# Patient Record
Sex: MALE | Race: BLACK OR AFRICAN AMERICAN | Employment: UNEMPLOYED | URBAN - METROPOLITAN AREA
[De-identification: names, ages, dates, MRNs, and addresses within clinical notes are randomized per-mention and may not be internally consistent; named-entity substitution may affect disease eponyms.]

---
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
## 2022-02-07 ENCOUNTER — HOSPITAL ENCOUNTER (EMERGENCY)
Age: 22
Discharge: LEFT BEFORE DISCHARGE | End: 2022-02-07
Attending: EMERGENCY MEDICINE
Payer: COMMERCIAL

## 2022-02-07 VITALS
BODY MASS INDEX: 40.43 KG/M2 | SYSTOLIC BLOOD PRESSURE: 142 MMHG | HEART RATE: 68 BPM | DIASTOLIC BLOOD PRESSURE: 76 MMHG | TEMPERATURE: 98.5 F | OXYGEN SATURATION: 99 % | WEIGHT: 315 LBS | HEIGHT: 74 IN | RESPIRATION RATE: 16 BRPM

## 2022-02-07 PROCEDURE — 75810000275 HC EMERGENCY DEPT VISIT NO LEVEL OF CARE

## 2023-05-16 RX ORDER — OMEPRAZOLE 20 MG/1
CAPSULE, DELAYED RELEASE ORAL DAILY
COMMUNITY
Start: 2019-12-02

## 2023-05-16 RX ORDER — SUCRALFATE 1 G/1
TABLET ORAL 4 TIMES DAILY
COMMUNITY
Start: 2019-12-02

## 2023-05-16 RX ORDER — ALBUTEROL SULFATE 90 UG/1
2 AEROSOL, METERED RESPIRATORY (INHALATION)
COMMUNITY

## 2025-01-04 ENCOUNTER — HOSPITAL ENCOUNTER (EMERGENCY)
Facility: HOSPITAL | Age: 25
Discharge: HOME | End: 2025-01-04

## 2025-01-04 VITALS
BODY MASS INDEX: 40.43 KG/M2 | TEMPERATURE: 98.8 F | HEIGHT: 74 IN | WEIGHT: 315 LBS | SYSTOLIC BLOOD PRESSURE: 163 MMHG | HEART RATE: 92 BPM | DIASTOLIC BLOOD PRESSURE: 80 MMHG | OXYGEN SATURATION: 99 % | RESPIRATION RATE: 20 BRPM

## 2025-01-04 DIAGNOSIS — U07.1 COVID-19: Primary | ICD-10-CM

## 2025-01-04 LAB
FLUAV RNA SPEC QL NAA+PROBE: NOT DETECTED
FLUBV RNA SPEC QL NAA+PROBE: NOT DETECTED
SARS-COV-2 RNA RESP QL NAA+PROBE: DETECTED
SOURCE: ABNORMAL

## 2025-01-04 PROCEDURE — 87636 SARSCOV2 & INF A&B AMP PRB: CPT

## 2025-01-04 PROCEDURE — 99283 EMERGENCY DEPT VISIT LOW MDM: CPT

## 2025-01-04 RX ORDER — IBUPROFEN 800 MG/1
800 TABLET, FILM COATED ORAL 3 TIMES DAILY PRN
Qty: 20 TABLET | Refills: 0 | Status: SHIPPED | OUTPATIENT
Start: 2025-01-04

## 2025-01-04 RX ORDER — ALBUTEROL SULFATE 90 UG/1
2 INHALANT RESPIRATORY (INHALATION) EVERY 4 HOURS PRN
Qty: 18 G | Refills: 0 | Status: SHIPPED | OUTPATIENT
Start: 2025-01-04

## 2025-01-04 RX ORDER — BENZONATATE 200 MG/1
200 CAPSULE ORAL 3 TIMES DAILY PRN
Qty: 15 CAPSULE | Refills: 0 | Status: SHIPPED | OUTPATIENT
Start: 2025-01-04 | End: 2025-01-09

## 2025-01-04 ASSESSMENT — LIFESTYLE VARIABLES
HOW OFTEN DO YOU HAVE A DRINK CONTAINING ALCOHOL: NEVER
HOW MANY STANDARD DRINKS CONTAINING ALCOHOL DO YOU HAVE ON A TYPICAL DAY: PATIENT DOES NOT DRINK

## 2025-01-05 NOTE — ED TRIAGE NOTES
ED visit d/t viral like sxs at home (coughing / fevers / fatigue / chills / malaise / body aches) - onset of sxs, 3 days ago - Endorses, hx of asthma - productive coughing at home, thick yellowish sputum - sick contacts at home, recently dxed with covid 19 - pt reports taking tylenol 1g nearly every 6 hours for reoccurring fevers - mother reports pt has a hx of HTN yet is not compliant with medications;;

## 2025-01-05 NOTE — ED PROVIDER NOTES
discussed)  None    Chronic Conditions:  has a past medical history of Asthma, Gastrointestinal disorder, and Morbid obesity.      Social Determinants affecting Dx or Tx: None    Records Reviewed (source and summary of external records): Nursing Notes and Old Medical Records    MDM: CC/HPI Summary, DDx, ED Course, and Reassessment, Disposition Considerations (Tests not done, Shared Decision Making, Pt Expectation of Test or Tx.):    Patient is a 24-year-old male who presents to the ED with cough, body aches, subjective fevers, chills and intermittent wheezing.  He does report a history of asthma but does not have an inhaler.  He states his mom who was present with them had COVID about 2 weeks ago but he denies any other known sick contacts.  On exam he is hypertensive but other vital signs stable, lung sounds are clear bilaterally, O2 sats 99% on room air, TMs and posterior pharynx unremarkable.  DDx: URI, COVID, flu, asthma.  Will send off COVID and flu swab due to recent exposure.  Mom is requesting an inhaler for patient to have as needed and possibly Tessalon Perles for the cough.    ED Course as of 01/04/25 2221   Sat Jan 04, 2025 2217 SARS-CoV-2, PCR(!): Detected  Pt and mother informed.  Will give inhaler to use prn, tessalon perles for cough and motrin for fever or pain [AH]      ED Course User Index  [AH] Marjorie Peunte PA-C       FINAL IMPRESSION     1. COVID-19          DISPOSITION/PLAN   DISPOSITION Decision To Discharge 01/04/2025 10:15:35 PM   DISPOSITION CONDITION Stable       Care plan outlined and precautions discussed.  Patient has no new complaints, changes, or physical findings.  Results of COVID were reviewed with the patient. All medications were reviewed with the patient; will d/c home. All of pt's questions and concerns were addressed. Patient was instructed and agrees to follow up with PCP, as well as to return to the ED upon further deterioration. Patient is ready to go

## 2025-05-24 ENCOUNTER — HOSPITAL ENCOUNTER (EMERGENCY)
Facility: HOSPITAL | Age: 25
Discharge: HOME | End: 2025-05-24

## 2025-05-24 VITALS
TEMPERATURE: 98 F | SYSTOLIC BLOOD PRESSURE: 132 MMHG | BODY MASS INDEX: 40.76 KG/M2 | OXYGEN SATURATION: 99 % | WEIGHT: 315 LBS | HEART RATE: 82 BPM | DIASTOLIC BLOOD PRESSURE: 89 MMHG | RESPIRATION RATE: 18 BRPM

## 2025-05-24 DIAGNOSIS — H00.024 HORDEOLUM INTERNUM OF LEFT UPPER EYELID: Primary | ICD-10-CM

## 2025-05-24 PROCEDURE — 99281 EMR DPT VST MAYX REQ PHY/QHP: CPT

## 2025-05-24 ASSESSMENT — ENCOUNTER SYMPTOMS
EYE REDNESS: 0
VOMITING: 0
COUGH: 0
EYE ITCHING: 1
NAUSEA: 0
ABDOMINAL PAIN: 0
EYE DISCHARGE: 1
EYE PAIN: 1
SHORTNESS OF BREATH: 0
BACK PAIN: 0
WHEEZING: 0
DIARRHEA: 0
CHEST TIGHTNESS: 0
SORE THROAT: 0
RHINORRHEA: 0
PHOTOPHOBIA: 0

## 2025-05-24 ASSESSMENT — PAIN - FUNCTIONAL ASSESSMENT: PAIN_FUNCTIONAL_ASSESSMENT: NONE - DENIES PAIN

## 2025-05-24 ASSESSMENT — VISUAL ACUITY: OU: 1

## 2025-05-24 NOTE — ED PROVIDER NOTES
[facility] EMERGENCY DEPARTMENT  EMERGENCY DEPARTMENT ENCOUNTER         Pt Name: Felix Solis  MRN: [id number]  Birthdate 2000  Date of evaluation: 5/24/2025  Provider: Claribel Tena PA-C   PCP: Carlito Khan MD  Note Started: 2:46 PM EDT on 5/24/25     CHIEF COMPLAINT       Chief Complaint   Patient presents with    Eye Drainage     Left eye swelling an discharge this AM.          HISTORY OF PRESENT ILLNESS: 1 or more elements      History Source: Patient and Patient's Mother  Arrival Mode: car  None     Felix Solis is a 24 y.o. male with medical history significant for obesity, asthma, GERD who presents via self with complaints of acute mild left upper eyelid irritation, swelling, crusting and drainage.  States that he noticed some irritation yesterday and then noticed the drainage this morning.  No medications or modifying factors.  He wears glasses but no contacts.  No injury or trauma to his eye.  No foreign body sensation, lightheadedness, dizziness, syncope, seizure, headache, visual disturbance, photophobia, fever, chills, nausea, vomiting.  No modifying factors prior to arrival.     Nursing Notes were all reviewed and agreed with or any disagreements were addressed in the HPI.     REVIEW OF SYSTEMS      Review of Systems   Constitutional:  Negative for activity change, appetite change, chills, diaphoresis, fatigue, fever and unexpected weight change.   HENT:  Negative for congestion, rhinorrhea and sore throat.    Eyes:  Positive for pain, discharge and itching. Negative for photophobia, redness and visual disturbance.   Respiratory:  Negative for cough, chest tightness, shortness of breath and wheezing.    Cardiovascular:  Negative for chest pain and palpitations.   Gastrointestinal:  Negative for abdominal pain, diarrhea, nausea and vomiting.   Musculoskeletal:  Negative for arthralgias, back pain, neck pain and neck stiffness.   Skin:  Negative for rash and wound.   Neurological:

## 2025-05-24 NOTE — ED NOTES
Patient came in and was evaluated by provider.  Left without receiving written discharge instructions.  Was never seen by or evaluated by RN prior to leaving.